# Patient Record
Sex: FEMALE
[De-identification: names, ages, dates, MRNs, and addresses within clinical notes are randomized per-mention and may not be internally consistent; named-entity substitution may affect disease eponyms.]

---

## 2022-09-27 PROBLEM — Z00.00 ENCOUNTER FOR PREVENTIVE HEALTH EXAMINATION: Status: ACTIVE | Noted: 2022-09-27

## 2022-10-07 ENCOUNTER — APPOINTMENT (OUTPATIENT)
Dept: OTOLARYNGOLOGY | Facility: CLINIC | Age: 62
End: 2022-10-07

## 2023-03-20 ENCOUNTER — APPOINTMENT (OUTPATIENT)
Dept: OTOLARYNGOLOGY | Facility: CLINIC | Age: 63
End: 2023-03-20
Payer: SELF-PAY

## 2023-03-20 VITALS
HEIGHT: 62 IN | OXYGEN SATURATION: 99 % | WEIGHT: 135 LBS | SYSTOLIC BLOOD PRESSURE: 140 MMHG | BODY MASS INDEX: 24.84 KG/M2 | HEART RATE: 78 BPM | DIASTOLIC BLOOD PRESSURE: 86 MMHG | TEMPERATURE: 97.2 F

## 2023-03-20 DIAGNOSIS — Z78.9 OTHER SPECIFIED HEALTH STATUS: ICD-10-CM

## 2023-03-20 DIAGNOSIS — Z83.3 FAMILY HISTORY OF DIABETES MELLITUS: ICD-10-CM

## 2023-03-20 DIAGNOSIS — Z82.49 FAMILY HISTORY OF ISCHEMIC HEART DISEASE AND OTHER DISEASES OF THE CIRCULATORY SYSTEM: ICD-10-CM

## 2023-03-20 PROCEDURE — 99203 OFFICE O/P NEW LOW 30 MIN: CPT

## 2023-03-20 NOTE — ASSESSMENT
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 Her main concerns are facial symmetry and smile reanimation. We discussed a masseter to buccal nerve transfer on the right, and this would restore a bite smile which would require a year or more time for regeneration and therapy. She deferred this option for now. \par \par We also discussed a facelift at the same time. On the right, this would be to help counteract the changes from the right facial nerve paralysis and the left side would be for cosmetic reasons and for symmetry. She will think about this and let us know if she would like to proceed.

## 2023-03-20 NOTE — HISTORY OF PRESENT ILLNESS
[de-identified] : 62 year old female who had Varicella in June 2021 and subsequent Gallegos Gates syndrome. She had facial therapy at that time but had only partial recovery of facial nerve. She reports she does not like her brow ptosis on right side as well as droopiness of right face and smile. She feels like she cannot smile now and the asymmetry is bothersome. Her son is getting  in October and would like to address prior. She has never had facial botox for symmetry. She did have threads for right face to help with midface lift, but she feels like she needs more. \par \par She had bilateral bleph, browlift, right canthoplasty in 2022.\par Hx DM, HTN

## 2023-03-20 NOTE — PHYSICAL EXAM
[Midline] : trachea located in midline position [de-identified] : Good resting symmetry\par Right commissure limited excursion on smile\par Deep NLF bilaterally\par Decreased right forehead rhytids, mild brow ptosis on right\par Oculo-oral synkinesis [Normal] : no rashes